# Patient Record
(demographics unavailable — no encounter records)

---

## 2024-10-10 NOTE — ASSESSMENT
[FreeTextEntry1] : Mr. CHRISTOPHER AWAN is a 55 year old current smoker (40 pack year hx)  man with HTN, HLD, CAD, chronic pancreatitis is here for evaluation.   #Pulmonary Nodules - imaging reviewd and discussed with patient CT chest  from 9/3/23 showed emphysema 1 cm solid nodule and 0.8 x 0.6 cm groundglass nodule in the right lower lobe. Repeat imaging Nov 21st with stable nodules. Given smoking, wt loss, ?biliary/pancreatic findings c/f underlying malignancy PET CT Jan 2024 - RLL nodule with new small calcific density, measures 6x5mm (was 10x8mm on prior CT), 3mm RUL nodule (stable), RLL GGO, no abnormal FDG activity in the lungs.  CT chest 4/2024 -  Stable 0.7 cm groundglass nodule in the right lower lobe when compared to previous exam. 1 cm nodular opacity in the posterior segment of the right lower lobe has increased in size when compared to previous exam.  CT chest 10/2024 - stable nodules Nodify lung testing Nov 2023 - 15% risk Nofidy lung 5/2024 - 15% risk of malignancy --Was evaluated by Dr. Sol.  Plan for watchful waiting.  Would repeat another CT chest in 6 months    #LIN/emphysema -  no e/o obstruction on PFTs  Nonspecific pattern. Notes improvmeent w Incruse + Albuterol --continue Incruse. Albuterol prn  #Smoker - negative effects of smoking and strategies for quitting discussed with patient -- smoking cessation strongly encouraged  #HCM -  UTD with flu vaccine needs PCV vaccination- will get at [facility]  All questions answered. Patient in agreement with plan.  Follow up 4mo

## 2024-10-10 NOTE — HISTORY OF PRESENT ILLNESS
[Current] : current [Never] : never [TextBox_4] : Mr. CHRISTOPHER AWAN is a 55 year old current smoker (40 pack year hx)  man with HTN, HLD, CAD, chronic pancreatitis is here for evaluation.   History: Was admitted to [facility] in 03/2023 for failure to thrive, was transferred to [facility] as he was found to have found to have CBD dilation with concern for pancreatic head mass and multiple cystic liver/pancreatic lesions concerning for abscesses. IR was consulted and given size, location of abscesses, deemed not suitable for drainage. CBD duct was placed Was treated with IV Zosyn and discharged to [facility].  Patient was hospitalized again 9/2023 with 40 pound weight loss, failure to thrive, hypotension.  CT chest  from 9/3/23 showed emphysema 1 cm solid nodule and 0.8 x 0.6 cm groundglass nodule in the right lower lobe.   Interval Events: Smoking about half a pack per day.  Using Incruse.  No shortness of breath no wheezing no chest tightness.  No albuterol use   ROS:  +abd pain; + wt loss denies fevers, chills, night sweats, unintentional weight loss denies known autoimmune disease  PMH: chronic pancreatitis;  Meds: per chart All: morphine SH: current smoker, currently down to 1/2 ppd; smoked since age 15 FH: Denies family hx of pulmonary or autoimmune disease PMD: Doesnt have PCP  [TextBox_11] : 1 [TextBox_13] : 40

## 2024-10-10 NOTE — COUNSELING
[Cessation strategies including cessation program discussed] : Cessation strategies including cessation program discussed [Use of nicotine replacement therapies and other medications discussed] : Use of nicotine replacement therapies and other medications discussed [Encouraged to pick a quit date and identify support needed to quit] : Encouraged to pick a quit date and identify support needed to quit [Yes] : Willing to quit smoking [FreeTextEntry3] : 12 [FreeTextEntry1] : 5

## 2024-11-07 NOTE — CONSULT LETTER
[Consult Letter:] : I had the pleasure of evaluating your patient, [unfilled]. [( Thank you for referring [unfilled] for consultation for _____ )] : Thank you for referring [unfilled] for consultation for [unfilled] [Please see my note below.] : Please see my note below. [Consult Closing:] : Thank you very much for allowing me to participate in the care of this patient.  If you have any questions, please do not hesitate to contact me. [Sincerely,] : Sincerely, [FreeTextEntry2] : Dr. Rocio Bauer (Pulm/Referring) [FreeTextEntry3] : Sarmad Sol MD, MPH  System Director of Thoracic Surgery  Director of Comprehensive Lung and Foregut [place]  Professor Cardiovascular & Thoracic Surgery   [facility] [phone] [place] [address] [place] [address] Tel: [phone] Fax: [phone]

## 2024-11-07 NOTE — PHYSICAL EXAM
[Fully active, able to carry on all pre-disease performance without restriction] : Status 0 - Fully active, able to carry on all pre-disease performance without restriction [Sclera] : the sclera and conjunctiva were normal [Extraocular Movements] : extraocular movements were intact [Neck Appearance] : the appearance of the neck was normal [Neck Cervical Mass (___cm)] : no neck mass was observed [] : no respiratory distress [Respiration, Rhythm And Depth] : normal respiratory rhythm and effort [Exaggerated Use Of Accessory Muscles For Inspiration] : no accessory muscle use [Auscultation Breath Sounds / Voice Sounds] : lungs were clear to auscultation bilaterally [Examination Of The Chest] : the chest was normal in appearance [Heart Rate And Rhythm] : heart rate was normal and rhythm regular [Chest Visual Inspection Thoracic Asymmetry] : no chest asymmetry [Diminished Respiratory Excursion] : normal chest expansion [2+] : left 2+ [Breast Appearance] : normal in appearance [Bowel Sounds] : normal bowel sounds [Breast Palpation Mass] : no palpable masses [Abdomen Soft] : soft [Abdomen Tenderness] : non-tender [Cervical Lymph Nodes Enlarged Posterior Bilaterally] : posterior cervical [Cervical Lymph Nodes Enlarged Anterior Bilaterally] : anterior cervical [Supraclavicular Lymph Nodes Enlarged Bilaterally] : supraclavicular [No CVA Tenderness] : no ~M costovertebral angle tenderness [No Spinal Tenderness] : no spinal tenderness [Involuntary Movements] : no involuntary movements were seen [Skin Color & Pigmentation] : normal skin color and pigmentation [No Focal Deficits] : no focal deficits [Oriented To Time, Place, And Person] : oriented to person, place, and time [FreeTextEntry1] : Deferred

## 2024-11-07 NOTE — HISTORY OF PRESENT ILLNESS
[FreeTextEntry1] : Mr. CHRISTOPHER AWAN, 56 year old male, current smoker (1/2 PPD x 40 years) , w/ hx of CAD, CHF, chronic pancreatitis, s/p Jose Guadalupe procedure 7/12/21. Developed obstructive jaundice 12/23 requiring stent placement. Stent subsequently removed in April 2024. Depression, Anxiety, COPD/Emphysema, iron-def anemia.  Follows w/ Pulm, Dr. Rocio Bauer. Referred for further evaluation of right lower lung nodule which was demonstrated on LDCT.  CT Chest on 4/23/24: - stable 1 cm nodular opacity is noted in the posterior segment of the right lower lobe.  - stable 0.7 cm groundglass nodule in the right lower lobe and a 0.3 cm solid nodule in the right upper lobe are noted. - No pleural effusions are noted.  Nodify test on 5/18/24: Moderate risk (15%).  PET/CT on 6/25/24: - Nonspecific uptake is noted in bilateral sternocleidomastoid muscles left more than right as well as the left longus coli muscle likely injury related. Nonspecific uptake is noted in both palatine and lingual tonsils - Slightly asymmetric uptake is noted in the right more than left submandibular gland. Attention on follow-up. - Small nonspecific subcentimeter bilateral level 2 faintly FDG avid lymph node nodes image 39, 42, 45 SUV 1.5 not significantly changed anatomically compared to PET CT scan from 1/16/2024. Attention on follow-up. - Previously noted right lower lobe lung nodule measures 1.1 x 0.9 cm image 138 (previously 0.6 x 0.5 cm) w/ SUV 1.2. - Stable right upper lobe 0.3 cm nodule image 89 too small to characterize by PET. - Biapical scarring with no abnormal FDG uptake. - Distinct FDG avid hepatic lesion in the caudate lobe image 160 SUV 4.8 with no clear CT correlate. Status post CBD stent removal. - Stable right adrenal gland 1.4 x 2.7 hypodensity with faint uptake less than liver suggestive of an adenoma image 165.  Nodule within posterior RLL w/ unclear etiology; Suspect may be inflammatory. Pt also would like to surveilliance.   CT Chest on 10/4/24 - no significant change in 1 cm nodular opacity in posterior segment of right lower lobe (3:433) which previously exhibited low intensity on prior PET scan.   Pt presents today for follow up. Overall, feels well. Today, patient denies worsening SOB, chest pain, cough, hemoptysis, fever, chills, night sweats, lightheadedness or dizziness.

## 2024-11-07 NOTE — ASSESSMENT
[FreeTextEntry1] : Mr. CHRISTOPHER AWAN, 56 year old male, current smoker (1/2 PPD x 40 years) , w/ hx of CAD, CHF, chronic pancreatitis, s/p Jose Guadalupe procedure 7/12/21. Developed obstructive jaundice 12/23 requiring stent placement. Stent subsequently removed in April 2024. Depression, Anxiety, COPD/Emphysema, iron-def anemia.  Follows w/ Pulm, Dr. Rocio Bauer. Referred for further evaluation of right lower lung nodule which was demonstrated on LDCT.  CT Chest on 4/23/24: - stable 1 cm nodular opacity is noted in the posterior segment of the right lower lobe.  - stable 0.7 cm groundglass nodule in the right lower lobe and a 0.3 cm solid nodule in the right upper lobe are noted. - No pleural effusions are noted.  Nodify test on 5/18/24: Moderate risk (15%).  PET/CT on 6/25/24: - Nonspecific uptake is noted in bilateral sternocleidomastoid muscles left more than right as well as the left longus coli muscle likely injury related. Nonspecific uptake is noted in both palatine and lingual tonsils - Slightly asymmetric uptake is noted in the right more than left submandibular gland. Attention on follow-up. - Small nonspecific subcentimeter bilateral level 2 faintly FDG avid lymph node nodes image 39, 42, 45 SUV 1.5 not significantly changed anatomically compared to PET CT scan from 1/16/2024. Attention on follow-up. - Previously noted right lower lobe lung nodule measures 1.1 x 0.9 cm image 138 (previously 0.6 x 0.5 cm) w/ SUV 1.2. - Stable right upper lobe 0.3 cm nodule image 89 too small to characterize by PET. - Biapical scarring with no abnormal FDG uptake. - Distinct FDG avid hepatic lesion in the caudate lobe image 160 SUV 4.8 with no clear CT correlate. Status post CBD stent removal. - Stable right adrenal gland 1.4 x 2.7 hypodensity with faint uptake less than liver suggestive of an adenoma image 165.  Nodule within posterior RLL w/ unclear etiology; Suspect may be inflammatory. Pt also would like to surveillance.   CT Chest on 10/4/24 - no significant change in 1 cm nodular opacity in posterior segment of right lower lobe (3:433) which previously exhibited low intensity on prior PET scan.   I have reviewed the patient's medical records and diagnostic images at time of this office consultation and have made the following recommendation: - 1 cm RLL nodule unchanged when compared to prior imaging; Etiology unclear. Discussed surgery for definitive diagnosis and therapeutic purposes vs active surveillance. Risks/benefits of both reviewed. At this time, patient opting to continue with active surveillance. Recommend repeat non contrast CT Chest in 6 months. Instructed to return to clinic 1-2 weeks following to discuss results and further plan of care. He is agreeable.  - Smoking Cessation discussed. Patient will consider.   I, SARIAH Tse, personally performed the evaluation and management (E/M) services for this established patient. That E/M includes conducting the examination, assessing all new/exacerbated conditions, and establishing a new plan of care. Today, My ACP, Ofelia Dodge, was here to observe my evaluation and management services for this patient to be followed going forward.

## 2024-11-07 NOTE — PHYSICAL EXAM
[Fully active, able to carry on all pre-disease performance without restriction] : Status 0 - Fully active, able to carry on all pre-disease performance without restriction [Sclera] : the sclera and conjunctiva were normal [Extraocular Movements] : extraocular movements were intact [Neck Appearance] : the appearance of the neck was normal [Neck Cervical Mass (___cm)] : no neck mass was observed [] : no respiratory distress [Respiration, Rhythm And Depth] : normal respiratory rhythm and effort [Exaggerated Use Of Accessory Muscles For Inspiration] : no accessory muscle use [Auscultation Breath Sounds / Voice Sounds] : lungs were clear to auscultation bilaterally [Heart Rate And Rhythm] : heart rate was normal and rhythm regular [Examination Of The Chest] : the chest was normal in appearance [Chest Visual Inspection Thoracic Asymmetry] : no chest asymmetry [Diminished Respiratory Excursion] : normal chest expansion [2+] : left 2+ [Breast Appearance] : normal in appearance [Bowel Sounds] : normal bowel sounds [Breast Palpation Mass] : no palpable masses [Abdomen Soft] : soft [Abdomen Tenderness] : non-tender [Cervical Lymph Nodes Enlarged Posterior Bilaterally] : posterior cervical [Supraclavicular Lymph Nodes Enlarged Bilaterally] : supraclavicular [Cervical Lymph Nodes Enlarged Anterior Bilaterally] : anterior cervical [No CVA Tenderness] : no ~M costovertebral angle tenderness [No Spinal Tenderness] : no spinal tenderness [Involuntary Movements] : no involuntary movements were seen [Skin Color & Pigmentation] : normal skin color and pigmentation [No Focal Deficits] : no focal deficits [Oriented To Time, Place, And Person] : oriented to person, place, and time [FreeTextEntry1] : Deferred

## 2024-11-07 NOTE — CONSULT LETTER
[Consult Letter:] : I had the pleasure of evaluating your patient, [unfilled]. [( Thank you for referring [unfilled] for consultation for _____ )] : Thank you for referring [unfilled] for consultation for [unfilled] [Please see my note below.] : Please see my note below. [Consult Closing:] : Thank you very much for allowing me to participate in the care of this patient.  If you have any questions, please do not hesitate to contact me. [Sincerely,] : Sincerely, [FreeTextEntry2] : Dr. Rocio Bauer (Pulm/Referring) [FreeTextEntry3] : Sarmad Sol MD, MPH  System Director of Thoracic Surgery  Director of Comprehensive Lung and Foregut [place]  Professor Cardiovascular & Thoracic Surgery   [facility] at [facility] [phone] [place] [address] [place] [address] Tel: [phone] Fax: [phone]

## 2025-03-04 NOTE — CONSULT LETTER
[Dear  ___] : Dear  [unfilled], [Consult Letter:] : I had the pleasure of evaluating your patient, [unfilled]. [Please see my note below.] : Please see my note below. [Consult Closing:] : Thank you very much for allowing me to participate in the care of this patient.  If you have any questions, please do not hesitate to contact me. [FreeTextEntry3] : Sincerely,  Rocio Bauer MD [facility] tel: [phone] fax: [phone]

## 2025-03-04 NOTE — HISTORY OF PRESENT ILLNESS
[Current] : current [Never] : never [TextBox_4] : Mr. CHRISTOPHER AWAN is a 55 year old current smoker (40 pack year hx)  man with HTN, HLD, CAD, chronic pancreatitis is here for evaluation.   History: Was admitted to [facility] in 03/2023 for failure to thrive, was transferred to [facility] as he was found to have found to have CBD dilation with concern for pancreatic head mass and multiple cystic liver/pancreatic lesions concerning for abscesses. IR was consulted and given size, location of abscesses, deemed not suitable for drainage. CBD duct was placed Was treated with IV Zosyn and discharged to [facility].  Patient was hospitalized again 9/2023 with 40 pound weight loss, failure to thrive, hypotension.  CT chest  from 9/3/23 showed emphysema 1 cm solid nodule and 0.8 x 0.6 cm groundglass nodule in the right lower lobe.   Interval Events: 3/2025: Fell, fractured his clavicle. Planned for surgery. Continues to smoke. Compliant with Anoro.  No albuterol use, no recent exacerbation or steroid use.  Denies shortness of breath, cough, wheezing   ROS:  +abd pain; + wt loss denies fevers, chills, night sweats, unintentional weight loss denies known autoimmune disease  PMH: chronic pancreatitis;  Meds: per chart All: morphine SH: current smoker, currently down to 1/2 ppd; smoked since age 15 FH: Denies family hx of pulmonary or autoimmune disease PMD: Doesnt have PCP  [TextBox_11] : 1 [TextBox_13] : 40

## 2025-03-04 NOTE — PHYSICAL EXAM
[No Acute Distress] : no acute distress [Normal Oropharynx] : normal oropharynx [Normal Appearance] : normal appearance [No Neck Mass] : no neck mass [Normal Rate/Rhythm] : normal rate/rhythm [Normal S1, S2] : normal s1, s2 [No Murmurs] : no murmurs [No Resp Distress] : no resp distress [Clear to Auscultation Bilaterally] : clear to auscultation bilaterally [No Abnormalities] : no abnormalities [Benign] : benign [Normal Gait] : normal gait [No Clubbing] : no clubbing [No Cyanosis] : no cyanosis [No Edema] : no edema [FROM] : FROM [Normal Color/ Pigmentation] : normal color/ pigmentation [No Focal Deficits] : no focal deficits [Oriented x3] : oriented x3 [Normal Affect] : normal affect [TextBox_2] : thin, ill appearing

## 2025-03-04 NOTE — COUNSELING
[Cessation strategies including cessation program discussed] : Cessation strategies including cessation program discussed [Use of nicotine replacement therapies and other medications discussed] : Use of nicotine replacement therapies and other medications discussed [Encouraged to pick a quit date and identify support needed to quit] : Encouraged to pick a quit date and identify support needed to quit [Yes] : Willing to quit smoking [FreeTextEntry1] : 5 [FreeTextEntry3] : 12

## 2025-03-04 NOTE — END OF VISIT
Yes...
[Time Spent: ___ minutes] : I have spent [unfilled] minutes of time on the encounter which excludes teaching and separately reported services.

## 2025-03-04 NOTE — ASSESSMENT
[FreeTextEntry1] : Mr. CHRISTOPHER AWAN is a 55 year old current smoker (40 pack year hx)  man with HTN, HLD, CAD, chronic pancreatitis is here for evaluation.   #Pre-operative evaluaiton -planned for OR for displaced left clavicle fracture.  From pulmonary standpoint, no contraindication to upcoming procedure.  It was discussed with patient that smoking cessation would decrease risk of perioperative pulmonary complications.  Recommend continuing Anoro as prescribed.  As needed bronchodilators could be used perioperatively  #Pulmonary Nodules - imaging reviewd and discussed with patient CT chest  from 9/3/23 showed emphysema 1 cm solid nodule and 0.8 x 0.6 cm groundglass nodule in the right lower lobe. Repeat imaging Nov 21st with stable nodules. Given smoking, wt loss, ?biliary/pancreatic findings c/f underlying malignancy PET CT Jan 2024 - RLL nodule with new small calcific density, measures 6x5mm (was 10x8mm on prior CT), 3mm RUL nodule (stable), RLL GGO, no abnormal FDG activity in the lungs.  CT chest 4/2024 -  Stable 0.7 cm groundglass nodule in the right lower lobe when compared to previous exam. 1 cm nodular opacity in the posterior segment of the right lower lobe has increased in size when compared to previous exam.  CT chest 10/2024 - Similar 1 cm nodular opacity in posterior segment of right lower lobe, which exhibited mild avidity on prior PET. Nodify lung testing Nov 2023 - 15% risk Nofidy lung 5/2024 - 15% risk of malignancy --Was evaluated by Dr. Sol.  Plan for repeat CT chest May 2025    #LIN/emphysema -  no e/o obstruction on PFTs  Nonspecific pattern.  -- c/s Anoro. Albuterol prn  #Smoker - negative effects of smoking and strategies for quitting discussed with patient -- smoking cessation strongly encouraged.  Patient will try nicotine patches plus gum  #HCM -  UTD with flu vaccine needs PCV vaccination- will get at [facility]  All questions answered. Patient in agreement with plan.  Follow up 4-6mo

## 2025-03-05 NOTE — PHYSICAL EXAM
[Alert] : alert [Normal Voice/Communication] : normal voice/communication [No Acute Distress] : no acute distress [Sclera] : the sclera and conjunctiva were normal [Hearing Threshold Finger Rub Not [place]] : hearing was normal [Normal Lips/Gums] : the lips and gums were normal [Oropharynx] : the oropharynx was normal [Normal Appearance] : the appearance of the neck was normal [No Neck Mass] : no neck mass was observed [No Respiratory Distress] : no respiratory distress [No Acc Muscle Use] : no accessory muscle use [Respiration, Rhythm And Depth] : normal respiratory rhythm and effort [Abdomen Tenderness] : non-tender [No Masses] : no abdominal mass palpated [Abdomen Soft] : soft [] : no hepatosplenomegaly [Oriented To Time, Place, And Person] : oriented to person, place, and time [de-identified] : thin, muscle wasting (temporal, hands)

## 2025-03-05 NOTE — HISTORY OF PRESENT ILLNESS
[FreeTextEntry1] : CHRISTOPHER AWAN is a 56 year old male with a history of chronic pancreatitis c/b significant pain s/p Jose Guadalupe procedure, depression,  pulm nodule, emphysema  He is presenting today for followup  He had a fall on Feb 9th and caused a broken left clavicle - pending mutliD clearance for surgery (clearances pending appts next week)  He is not feeling well Increased weight loss - lost over 20lbs in last couple of months (170 -->140 now) Poor appetite - forcing food - couple of weeks - nothing tastes good, it takes a long time to eat Has central abdominal pain, a little lower than usual pancreas pain, dull, nauseating Having "hot flashes" with head sweating Stools are loose vs diarrhea, sometimes solid, no blood  Hasn't been eating enough to take Creon  Had colonoscopy in 2020 Last EGD/EUS/ERCP with me in Spe 2024 - severely calcified pancreas difficult to see, previously placed CBD stent removed No labs or imaging since then  +Depression, stopped his meds/ insurance lapsed and he needs to call psychiatrist Not suicidal but very lonely, has no one to call, lives with brother but doesn't talk much Still smoking 2 beers last week but not drinking ETOH generally

## 2025-03-05 NOTE — ASSESSMENT
[FreeTextEntry1] : CHRISTOPHER AWAN is a 56 year old male with a history of chronic pancreatitis c/b significant pain s/p Jose Guadalupe procedure, depression,  pulm nodule, emphysema  #Signifiant unintentional weight loss with sweats, anorexia #Abd pain, nausea #Chronic calcific pancreatitis s/p Jose Guadalupe procedure #Tobacco use #Fractured left clavicle  Recs: - concern for malignancy; will check labs now and get CT A/P - pending results of above will likely plan for EGD/colonoscopy and probably EUS again - Will also talk about Creon, zofran scripts, appetite stimulants pending workup - try to focus on protein for now  - get back in touch with psychiatrist to get back on antidepressants/behavioral therapy  - followup in office pending above

## 2025-03-07 NOTE — HISTORY OF PRESENT ILLNESS
[FreeTextEntry1] : bumps on the scalp [de-identified] : 56M with a hx of seb derm, well controlled with keto, here for   1) bumps on the scalp x months. Hx of AK and iSK

## 2025-03-07 NOTE — PHYSICAL EXAM
[Alert] : alert [Oriented x 3] : ~L oriented x 3 [Well Nourished] : well nourished [Conjunctiva Non-injected] : conjunctiva non-injected [No Visual Lymphadenopathy] : no visual  lymphadenopathy [No Clubbing] : no clubbing [No Edema] : no edema [No Bromhidrosis] : no bromhidrosis [No Chromhidrosis] : no chromhidrosis [FreeTextEntry3] : Stuck on brown papules on the scalp Face otherwise with mild erythema in the NLF

## 2025-05-06 NOTE — HISTORY OF PRESENT ILLNESS
[de-identified] : 57yo male who is being referred by Dr. Huerta for incidental finding noted on recent PET scan done for 4/17/2025 PET/CT scan reporting focal areas of activity in the tongue. Intense activity in the larynx at the levels of the vocal cords without abnormality on the low dose non contrast CT

## 2025-05-08 NOTE — CONSULT LETTER
[Consult Letter:] : I had the pleasure of evaluating your patient, [unfilled]. [( Thank you for referring [unfilled] for consultation for _____ )] : Thank you for referring [unfilled] for consultation for [unfilled] [Please see my note below.] : Please see my note below. [Consult Closing:] : Thank you very much for allowing me to participate in the care of this patient.  If you have any questions, please do not hesitate to contact me. [Sincerely,] : Sincerely, [FreeTextEntry2] : Dr. Rocio Bauer (Pulm/Referring) [FreeTextEntry3] : Sarmad Sol MD, MPH  System Director of Thoracic Surgery  Director of Comprehensive Lung and Foregut [place]  Professor Cardiovascular & Thoracic Surgery   [facility] at [facility] [phone] [place] [address] [place] [address] Tel: [phone] Fax: [phone]

## 2025-05-08 NOTE — CONSULT LETTER
[Consult Letter:] : I had the pleasure of evaluating your patient, [unfilled]. [( Thank you for referring [unfilled] for consultation for _____ )] : Thank you for referring [unfilled] for consultation for [unfilled] [Please see my note below.] : Please see my note below. [Consult Closing:] : Thank you very much for allowing me to participate in the care of this patient.  If you have any questions, please do not hesitate to contact me. [Sincerely,] : Sincerely, [FreeTextEntry2] : Dr. Rocio Bauer (Pulm/Referring) [FreeTextEntry3] : Sarmad Sol MD, MPH  System Director of Thoracic Surgery  Director of Comprehensive Lung and Foregut [place]  Professor Cardiovascular & Thoracic Surgery   [facility] at [facility] [phone] [place] [address] [place] [address] Tel: [phone] Fax: [phone]

## 2025-05-08 NOTE — PHYSICAL EXAM
[Respiration, Rhythm And Depth] : normal respiratory rhythm and effort [Exaggerated Use Of Accessory Muscles For Inspiration] : no accessory muscle use [Auscultation Breath Sounds / Voice Sounds] : lungs were clear to auscultation bilaterally [Apical Impulse] : the apical impulse was normal [Heart Rate And Rhythm] : heart rate was normal and rhythm regular [Heart Sounds] : normal S1 and S2 [Heart Sounds Gallop] : no gallops [Examination Of The Chest] : the chest was normal in appearance [No Pulse Delay] : no pulse delay [Bowel Sounds] : normal bowel sounds [Abdomen Soft] : soft [Abdomen Tenderness] : non-tender [No CVA Tenderness] : no ~M costovertebral angle tenderness [Abnormal Walk] : normal gait [Nail Clubbing] : no clubbing  or cyanosis of the fingernails [Involuntary Movements] : no involuntary movements were seen [Skin Color & Pigmentation] : normal skin color and pigmentation [Skin Turgor] : normal skin turgor [] : no rash [No Focal Deficits] : no focal deficits [Oriented To Time, Place, And Person] : oriented to person, place, and time [Affect] : the affect was normal [Mood] : the mood was normal

## 2025-05-08 NOTE — CONSULT LETTER
[Consult Letter:] : I had the pleasure of evaluating your patient, [unfilled]. [( Thank you for referring [unfilled] for consultation for _____ )] : Thank you for referring [unfilled] for consultation for [unfilled] [Please see my note below.] : Please see my note below. [Consult Closing:] : Thank you very much for allowing me to participate in the care of this patient.  If you have any questions, please do not hesitate to contact me. [Sincerely,] : Sincerely, [FreeTextEntry2] : Dr. Rocio Bauer (Pulm/Referring) [FreeTextEntry3] : Sarmad Sol MD, MPH  System Director of Thoracic Surgery  Director of Comprehensive Lung and Foregut [place]  Professor Cardiovascular & Thoracic Surgery   [facility] at [facility] [phone] [place] [address] [place] [address] Tel: [phone] Fax: [phone]

## 2025-05-08 NOTE — HISTORY OF PRESENT ILLNESS
[FreeTextEntry1] : Mr. CHRISTOPHER AWAN, 56 year old male, current smoker (1/2 PPD x 40 years) , w/ hx of CAD, CHF, chronic pancreatitis, s/p Jose Guadalupe procedure 7/12/21. Developed obstructive jaundice 12/23 requiring stent placement. Stent subsequently removed in April 2024. Depression, Anxiety, COPD/Emphysema, iron-def anemia.  Follows w/ Pulm, Dr. Rocio Bauer. Referred for further evaluation of right lower lung nodule which was demonstrated on LDCT.  CT Chest on 4/23/24: - stable 1 cm nodular opacity is noted in the posterior segment of the right lower lobe.  - stable 0.7 cm groundglass nodule in the right lower lobe and a 0.3 cm solid nodule in the right upper lobe are noted. - No pleural effusions are noted.  Nodify test on 5/18/24: Moderate risk (15%).  PET/CT on 6/25/24: - Nonspecific uptake is noted in bilateral sternocleidomastoid muscles left more than right as well as the left longus coli muscle likely injury related. Nonspecific uptake is noted in both palatine and lingual tonsils - Slightly asymmetric uptake is noted in the right more than left submandibular gland. Attention on follow-up. - Small nonspecific subcentimeter bilateral level 2 faintly FDG avid lymph node nodes image 39, 42, 45 SUV 1.5 not significantly changed anatomically compared to PET CT scan from 1/16/2024. Attention on follow-up. - Previously noted right lower lobe lung nodule measures 1.1 x 0.9 cm image 138 (previously 0.6 x 0.5 cm) w/ SUV 1.2. - Stable right upper lobe 0.3 cm nodule image 89 too small to characterize by PET. - Biapical scarring with no abnormal FDG uptake. - Distinct FDG avid hepatic lesion in the caudate lobe image 160 SUV 4.8 with no clear CT correlate. Status post CBD stent removal. - Stable right adrenal gland 1.4 x 2.7 hypodensity with faint uptake less than liver suggestive of an adenoma image 165.  Nodule within posterior RLL w/ unclear etiology; Suspect may be inflammatory. Pt also would like to surveilliance.   CT Chest on 10/4/24 - no significant change in 1 cm nodular opacity in posterior segment of right lower lobe (3:433) which previously exhibited low intensity on prior PET scan.   CT C/A/P on 3/18/25: - increased 1.6 x 0.9 cm RLL solid nodule - stable scattered ggos: 0.5cm RUL (6:31); 0.5cm RUL (6:40); 0.6 cm RLL (6:61); 0.6 cm GOPAL (6:34); 0.4 cm GOPAL (6:60) - mildly increased biliary ductal dilation from 3/24/25 - T8 vertebral body compression with mult. chronic Lt rib fractures new since 10/4/24.   PFT on 3/4/25: FEV1 pre 3.41, 83%; DLCO 27.2, 123%.   Patient presents today for follow-up. The patient denies SOB, cough, chest pain, hemoptysis, palpitation, fever. He feels well today.

## 2025-05-08 NOTE — PHYSICAL EXAM
[Respiration, Rhythm And Depth] : normal respiratory rhythm and effort [Exaggerated Use Of Accessory Muscles For Inspiration] : no accessory muscle use [Auscultation Breath Sounds / Voice Sounds] : lungs were clear to auscultation bilaterally [Apical Impulse] : the apical impulse was normal [Heart Rate And Rhythm] : heart rate was normal and rhythm regular [Heart Sounds] : normal S1 and S2 [Heart Sounds Gallop] : no gallops [Examination Of The Chest] : the chest was normal in appearance [No Pulse Delay] : no pulse delay [Bowel Sounds] : normal bowel sounds [Abdomen Soft] : soft [Abdomen Tenderness] : non-tender [No CVA Tenderness] : no ~M costovertebral angle tenderness [Abnormal Walk] : normal gait normal... [Nail Clubbing] : no clubbing  or cyanosis of the fingernails [Involuntary Movements] : no involuntary movements were seen [Skin Color & Pigmentation] : normal skin color and pigmentation [Skin Turgor] : normal skin turgor [] : no rash [No Focal Deficits] : no focal deficits [Oriented To Time, Place, And Person] : oriented to person, place, and time [Affect] : the affect was normal [Mood] : the mood was normal

## 2025-05-08 NOTE — ASSESSMENT
[FreeTextEntry1] : Mr. CHRISTOPHER AWAN, 56 year old male, current smoker (1/2 PPD x 40 years) , w/ hx of CAD, CHF, chronic pancreatitis, s/p Jose Guadalupe procedure 7/12/21. Developed obstructive jaundice 12/23 requiring stent placement. Stent subsequently removed in April 2024. Depression, Anxiety, COPD/Emphysema, iron-def anemia.  Follows w/ Pulm, Dr. Rocio Bauer. Referred for further evaluation of right lower lung nodule which was demonstrated on LDCT.  Nodify test on 5/18/24: Moderate risk (15%).  Nodule within posterior RLL w/ unclear etiology; Suspect may be inflammatory. Pt also would like to surveilliance.   CT C/A/P on 3/18/25: - increased 1.6 x 0.9 cm RLL solid nodule - stable scattered ggos: 0.5cm RUL (6:31); 0.5cm RUL (6:40); 0.6 cm RLL (6:61); 0.6 cm GOPAL (6:34); 0.4 cm GOPAL (6:60) - mildly increased biliary ductal dilation from 3/24/25 - T8 vertebral body compression with mult. chronic Lt rib fractures new since 10/4/24.    PFT on 3/4/25: FEV1 pre 3.41, 83%; DLCO 27.2, 123%.   I have reviewed the patient's medical records and diagnostic images at the time of this office consultation and have made the following recommendation: 1. Increasing size of RLL nodule, suspicious for malignancy, recommend him to have right VATS, robotic assist, right lower lobe wedge resection, possible segmentectomy, MLND on 6/2/25. All risks vs. benefits and alternatives were explained to the patient, all questions were answered, patient verbalized understanding, was in agreement with the plan to proceed. 2. PET/CT to r/o mets. 3. Medical clearance and PST.  4. Smoking cessation counseling given to him today, he says will try to stop smoking.      I, Dr. DAWSON, SARIAH WALTERS, personally performed the evaluation and management (E/M) services for this established patient who follow up today with an existing condition.  That E/M includes conducting the examination, assessing all new/exacerbated/existing conditions, and establishing a plan of care. Today, my ACP, Fanny Hall NP, was here to observe my evaluation and management services for this existing condition to be followed going forward.

## 2025-05-23 NOTE — HISTORY OF PRESENT ILLNESS
[de-identified] : 55yo male who is being referred by Dr. Huerta for incidental finding noted on recent PET scan done for 4/17/2025 PET/CT scan reporting focal areas of activity in the tongue. Intense activity in the larynx at the levels of the vocal cords without abnormality on the low dose non contrast CT

## 2025-05-28 NOTE — HISTORY OF PRESENT ILLNESS
[FreeTextEntry1] : 57 year old male with a history of chronic pancreatitis c/b significant pain s/p Jose Guadalupe procedure, depression, pulm nodule, emphysema  He is presenting for followup  Since last visit he has seen onc (Merritt) who completed workup for unintentional wt loss PET CT showed uptake in posterior tongue, pending ENT visit, lung nodules, otherwise no major concerning findings Lung nodules stable except one 1.6cm one that increased in size - possible malignant - going for VATS on Tuesday with Dr. Sarmad Sol Going to PT for broken clavicle, will need pulm clearance to resume after VATS, has ortho followup in a few months Pending followup with his psychiatrist to discuss medication adjustments  CEA was elevated slightly 4.4 (ULN 3.8), last colon a long time ago  He has started mirtazipine; has megestrol for appetite stim as well He has been eating more - has gained back ~15lbs since I last saw him Breakfast- cereal no lunch Dinner - cheeseburger, chicken Snacks - cereal, ice cream  Needs Creon refill to start taking again   He is still smoking but cut down from 1+ packs to 10cig/day Trying to quit Very rare ETOH - had a drink on his birthday last week Talking to ex-GF again, hopeful it works out

## 2025-05-28 NOTE — PHYSICAL EXAM
[Alert] : alert [Normal Voice/Communication] : normal voice/communication [Healthy Appearing] : healthy appearing [No Acute Distress] : no acute distress [Sclera] : the sclera and conjunctiva were normal [Hearing Threshold Finger Rub Not [place]] : hearing was normal [Normal Lips/Gums] : the lips and gums were normal [Oropharynx] : the oropharynx was normal [Normal Appearance] : the appearance of the neck was normal [No Neck Mass] : no neck mass was observed [No Respiratory Distress] : no respiratory distress [Respiration, Rhythm And Depth] : normal respiratory rhythm and effort [Abdomen Soft] : soft [Oriented To Time, Place, And Person] : oriented to person, place, and time [de-identified] : appears to have gained weight

## 2025-05-28 NOTE — ASSESSMENT
[FreeTextEntry1] : 57 year old male with a history of chronic pancreatitis c/b significant pain s/p Jose Guadalupe procedure, depression, pulm nodule, emphysema  #Elevated CEA 4.4  #Unintentional weight loss -now improving, gained 15+ lbs #Chronic pancreatitis/EPI #Lung nodules #Abnormal tongue PET uptakes # Chronic tobacco use  Recs: - will plan for EGD/EUS/colonoscopy for CEA evaluation + check bile duct, pancreas - given normal liver tests will not plan for ERCP/stent for now, repeat labs next visit - followup with ENT for tongue evaluation - Refilled Creon, take 2 tabs with meals - continue mirtazapine, megestrol for appetite stim - followup with psychiatrist for med adjustment  - The patient was instructed on the colonoscopy prep, including low fiber diet x 1 week, clear liquid diet x 1 day prior to the procedure, NPO two hours prior to the procedure, and instructions for use of the prescribed prep itself. - Benefits and risks of the procedure (including but not limited to pain, infection, bleeding, perforation, injury to internal organs, missed lesions, IV site problems, risks of anesthesia, possible need for further procedures including emergency surgery) were explained to the patient. Alternatives to the procedure were discussed. The patient was agreeable to proceed. - no PST since just went last week - followup with me after procedure in office

## 2025-06-27 NOTE — HISTORY OF PRESENT ILLNESS
[Current] : current [Never] : never [TextBox_4] : Mr. CHRISTOPHER AWAN is a 55 year old current smoker (40 pack year hx)  man with HTN, HLD, CAD, chronic pancreatitis is here for evaluation.   History: Was admitted to [facility] in 03/2023 for failure to thrive, was transferred to [facility] as he was found to have found to have CBD dilation with concern for pancreatic head mass and multiple cystic liver/pancreatic lesions concerning for abscesses. IR was consulted and given size, location of abscesses, deemed not suitable for drainage. CBD duct was placed Was treated with IV Zosyn and discharged to [facility].  Patient was hospitalized again 9/2023 with 40 pound weight loss, failure to thrive, hypotension.  CT chest  from 9/3/23 showed emphysema 1 cm solid nodule and 0.8 x 0.6 cm groundglass nodule in the right lower lobe.   Interval Events: 3/2025: Fell, fractured his clavicle. Planned for surgery. Continues to smoke. Compliant with Anoro.  No albuterol use, no recent exacerbation or steroid use.  Denies shortness of breath, cough, wheezing  6/2025: s/p RLL wedge resection with Dr Sol on 6/2/25 - patology with Minimally invasive adenocarcinoma, nonmucinous. The tumor spans 0.9 cm with 2 mm of solid pattern invasion. No visceral pleural or lymphovascular invasion is identified. The surgical margin is negative. LNs are negative. Feels well. Notes mild discomfort under Right ribs in mid axillary line.  Compliant with Anoro.  Denies respiratory issues at this time.  Still smoking   ROS:  +abd pain; + wt loss denies fevers, chills, night sweats, unintentional weight loss denies known autoimmune disease  PMH: chronic pancreatitis;  Meds: per chart All: morphine SH: current smoker, currently down to 1/2 ppd; smoked since age 15 FH: Denies family hx of pulmonary or autoimmune disease PMD: Doesnt have PCP  [TextBox_11] : 1 [TextBox_13] : 40

## 2025-06-27 NOTE — CONSULT LETTER
[Dear  ___] : Dear  [unfilled], [Consult Letter:] : I had the pleasure of evaluating your patient, [unfilled]. [Please see my note below.] : Please see my note below. [Consult Closing:] : Thank you very much for allowing me to participate in the care of this patient.  If you have any questions, please do not hesitate to contact me. [FreeTextEntry3] : Sincerely,  Rocio Bauer MD [facility] tel: [phone] fax: [phone]

## 2025-06-27 NOTE — ASSESSMENT
[FreeTextEntry1] : Mr. CHRISTOPHER AWAN is a 55 year old current smoker (40 pack year hx)  man with HTN, HLD, CAD, chronic pancreatitis is here for evaluation.   #Lung adenocarcinoma --  s/p RLL wedge resection with Dr Sol on 6/2/25 - patology with Minimally invasive adenocarcinoma, nonmucinous.  The tumor spans 0.9 cm with 2 mm of solid pattern invasion.  No visceral pleural or lymphovascular invasion is identified.  The surgical margin is negative. LNs are negative.  -- will f/u with Dr Sol next week -- repeat CT chest in 3mo   #LIN/emphysema -  no e/o obstruction on PFTs  Nonspecific pattern.  -- c/s Anoro. Albuterol prn  #Smoker - negative effects of smoking and strategies for quitting discussed with patient -- smoking cessation strongly encouraged.  Patient will try nicotine patches plus gum  #HCM -  UTD with flu vaccine needs PCV vaccination- will get at [facility]  All questions answered. Patient in agreement with plan.  Follow up 4-mo

## 2025-07-03 NOTE — REASON FOR VISIT
[de-identified] : FB, Rt VATS, wedge rxn of RLL, MLND, additional wedge resection of right lower lobe staple margin [de-identified] : 6/2/25

## 2025-07-03 NOTE — ASSESSMENT
[FreeTextEntry1] : Mr. CHRISTOPHER AWAN, 56 year old male, current smoker (1/2 PPD x 40 years) , w/ hx of CAD, CHF, chronic pancreatitis, s/p Jose Guadalupe procedure 7/12/21. Developed obstructive jaundice 12/23 requiring stent placement. Stent subsequently removed in April 2024. Depression, Anxiety, COPD/Emphysema, iron-def anemia. Follows w/ Pulm, Dr. Rocio Bauer. Referred for further evaluation of right lower lung nodule which was demonstrated on LDCT.  CT Chest on 4/23/24: - stable 1 cm nodular opacity is noted in the posterior segment of the right lower lobe. - stable 0.7 cm groundglass nodule in the right lower lobe and a 0.3 cm solid nodule in the right upper lobe are noted. - No pleural effusions are noted.  Nodify test on 5/18/24: Moderate risk (15%).  PET/CT on 6/25/24: - Nonspecific uptake is noted in bilateral sternocleidomastoid muscles left more than right as well as the left longus coli muscle likely injury related. Nonspecific uptake is noted in both palatine and lingual tonsils - Slightly asymmetric uptake is noted in the right more than left submandibular gland. Attention on follow-up. - Small nonspecific subcentimeter bilateral level 2 faintly FDG avid lymph node nodes image 39, 42, 45 SUV 1.5 not significantly changed anatomically compared to PET CT scan from 1/16/2024. Attention on follow-up. - Previously noted right lower lobe lung nodule measures 1.1 x 0.9 cm image 138 (previously 0.6 x 0.5 cm) w/ SUV 1.2. - Stable right upper lobe 0.3 cm nodule image 89 too small to characterize by PET. - Biapical scarring with no abnormal FDG uptake. - Distinct FDG avid hepatic lesion in the caudate lobe image 160 SUV 4.8 with no clear CT correlate. Status post CBD stent removal. - Stable right adrenal gland 1.4 x 2.7 hypodensity with faint uptake less than liver suggestive of an adenoma image 165.  Nodule within posterior RLL w/ unclear etiology; Suspect may be inflammatory. Pt also would like to surveilliance.  CT Chest on 10/4/24 - no significant change in 1 cm nodular opacity in posterior segment of right lower lobe (3:433) which previously exhibited low intensity on prior PET scan.  CT C/A/P on 3/18/25: - increased 1.6 x 0.9 cm RLL solid nodule - stable scattered ggos: 0.5cm RUL (6:31); 0.5cm RUL (6:40); 0.6 cm RLL (6:61); 0.6 cm GOPAL (6:34); 0.4 cm GOPAL (6:60) - mildly increased biliary ductal dilation from 3/24/25 - T8 vertebral body compression with mult. chronic Lt rib fractures new since 10/4/24.  PFT on 3/4/25: FEV1 pre 3.41, 83%; DLCO 27.2, 123%.  PET/CT on 4/17/25 at [facility]: - focal areas of activity in tongue; Rt anterolaterally (SUV 6.7) & Rt posterolaterally (SUV 5.7) - 0.3cm RUL nodular density (2:68) - 0.7cm GOPAL mixed ettenuation density (2:67) - 0.3cm GOPAL nodular density (2:78)  Now s/p FB, Rt VATS, wedge rxn of RLL, MLND, additional wedge resection of right lower lobe staple margin on 6/2/25. Path revealed RLL minimally invasive adenocarcinoma, non-mucinous, 9 mm with 2 mm invasive tumor size, all margins and LNs are negative, [id number]. Stg IA1.   CXR today - Stable, post op changes.   Patient presents today for post op evaluation. + pain to surgical site radiating anteriorly. Otherwise feels well; Has been increasing activity as tolerated. Today, patient denies hemoptysis, fever, chills, night sweats, lightheadedness or dizziness.  I have reviewed the patient's medical records and diagnostic images at time of this office consultation and have made the following recommendation: 1. Patient overall feeling well post operatively with stable CXR. Path reviewed; Stage I lung cancer. No need for adjuvant treatment at this time. Discussed role of active surveillance post op. Recommend repeating a non contrast CT Chest in 6 months for lung cancer surviellance as well as re-evaluate additional lung nodules. Patient is agreeable.  2. Cleared to return to physical therapy for shoulder.  3. Patient experiencing some post op pain. Likely neuropathic related. Will prescribe a trial of Gabapentin. Side effects reviewed; He is agreeable.   Recommendations reviewed with patient during this office visit, and all questions answered; Patient instructed on the importance of follow up and verbalizes understanding.  I, SARIAH Tse, personally performed the evaluation and management (E/M) services for this established patient. That E/M includes conducting the examination, assessing all new/exacerbated conditions, and establishing a new plan of care. Today, My ACP, Ofelia Dodge, was here to observe my evaluation and management services for this patient to be followed going forward.

## 2025-07-03 NOTE — CONSULT LETTER
[Consult Letter:] : I had the pleasure of evaluating your patient, [unfilled]. [( Thank you for referring [unfilled] for consultation for _____ )] : Thank you for referring [unfilled] for consultation for [unfilled] [Please see my note below.] : Please see my note below. [Consult Closing:] : Thank you very much for allowing me to participate in the care of this patient.  If you have any questions, please do not hesitate to contact me. [Sincerely,] : Sincerely, [FreeTextEntry2] : Dr. Rocio Bauer (Pulm/Referring) [FreeTextEntry3] : Sarmad Sol MD, MPH  System Director of Thoracic Surgery  Director of Comprehensive Lung and Foregut [place]  Professor Cardiovascular & Thoracic Surgery   [facility] [phone] [place] [address] [place] [address] Tel: [phone] Fax: [phone]

## 2025-07-03 NOTE — REASON FOR VISIT
[de-identified] : FB, Rt VATS, wedge rxn of RLL, MLND, additional wedge resection of right lower lobe staple margin [de-identified] : 6/2/25

## 2025-07-03 NOTE — ASSESSMENT
[FreeTextEntry1] : Mr. CHRISTOPHER AWAN, 56 year old male, current smoker (1/2 PPD x 40 years) , w/ hx of CAD, CHF, chronic pancreatitis, s/p Jose Guadalupe procedure 7/12/21. Developed obstructive jaundice 12/23 requiring stent placement. Stent subsequently removed in April 2024. Depression, Anxiety, COPD/Emphysema, iron-def anemia. Follows w/ Pulm, Dr. Rocio Bauer. Referred for further evaluation of right lower lung nodule which was demonstrated on LDCT.  CT Chest on 4/23/24: - stable 1 cm nodular opacity is noted in the posterior segment of the right lower lobe. - stable 0.7 cm groundglass nodule in the right lower lobe and a 0.3 cm solid nodule in the right upper lobe are noted. - No pleural effusions are noted.  Nodify test on 5/18/24: Moderate risk (15%).  PET/CT on 6/25/24: - Nonspecific uptake is noted in bilateral sternocleidomastoid muscles left more than right as well as the left longus coli muscle likely injury related. Nonspecific uptake is noted in both palatine and lingual tonsils - Slightly asymmetric uptake is noted in the right more than left submandibular gland. Attention on follow-up. - Small nonspecific subcentimeter bilateral level 2 faintly FDG avid lymph node nodes image 39, 42, 45 SUV 1.5 not significantly changed anatomically compared to PET CT scan from 1/16/2024. Attention on follow-up. - Previously noted right lower lobe lung nodule measures 1.1 x 0.9 cm image 138 (previously 0.6 x 0.5 cm) w/ SUV 1.2. - Stable right upper lobe 0.3 cm nodule image 89 too small to characterize by PET. - Biapical scarring with no abnormal FDG uptake. - Distinct FDG avid hepatic lesion in the caudate lobe image 160 SUV 4.8 with no clear CT correlate. Status post CBD stent removal. - Stable right adrenal gland 1.4 x 2.7 hypodensity with faint uptake less than liver suggestive of an adenoma image 165.  Nodule within posterior RLL w/ unclear etiology; Suspect may be inflammatory. Pt also would like to surveilliance.  CT Chest on 10/4/24 - no significant change in 1 cm nodular opacity in posterior segment of right lower lobe (3:433) which previously exhibited low intensity on prior PET scan.  CT C/A/P on 3/18/25: - increased 1.6 x 0.9 cm RLL solid nodule - stable scattered ggos: 0.5cm RUL (6:31); 0.5cm RUL (6:40); 0.6 cm RLL (6:61); 0.6 cm GOPAL (6:34); 0.4 cm GOPAL (6:60) - mildly increased biliary ductal dilation from 3/24/25 - T8 vertebral body compression with mult. chronic Lt rib fractures new since 10/4/24.  PFT on 3/4/25: FEV1 pre 3.41, 83%; DLCO 27.2, 123%.  PET/CT on 4/17/25 at [place] [facility]: - focal areas of activity in tongue; Rt anterolaterally (SUV 6.7) & Rt posterolaterally (SUV 5.7) - 0.3cm RUL nodular density (2:68) - 0.7cm GOPAL mixed ettenuation density (2:67) - 0.3cm GOPAL nodular density (2:78)  Now s/p FB, Rt VATS, wedge rxn of RLL, MLND, additional wedge resection of right lower lobe staple margin on 6/2/25. Path revealed RLL minimally invasive adenocarcinoma, non-mucinous, 9 mm with 2 mm invasive tumor size, all margins and LNs are negative, [id number]. Stg IA1.   CXR today - Stable, post op changes.   Patient presents today for post op evaluation. + pain to surgical site radiating anteriorly. Otherwise feels well; Has been increasing activity as tolerated. Today, patient denies hemoptysis, fever, chills, night sweats, lightheadedness or dizziness.  I have reviewed the patient's medical records and diagnostic images at time of this office consultation and have made the following recommendation: 1. Patient overall feeling well post operatively with stable CXR. Path reviewed; Stage I lung cancer. No need for adjuvant treatment at this time. Discussed role of active surveillance post op. Recommend repeating a non contrast CT Chest in 6 months for lung cancer surviellance as well as re-evaluate additional lung nodules. Patient is agreeable.  2. Cleared to return to physical therapy for shoulder.  3. Patient experiencing some post op pain. Likely neuropathic related. Will prescribe a trial of Gabapentin. Side effects reviewed; He is agreeable.   Recommendations reviewed with patient during this office visit, and all questions answered; Patient instructed on the importance of follow up and verbalizes understanding.  I, SARIAH Tse, personally performed the evaluation and management (E/M) services for this established patient. That E/M includes conducting the examination, assessing all new/exacerbated conditions, and establishing a new plan of care. Today, My ACP, Ofelia Dodge, was here to observe my evaluation and management services for this patient to be followed going forward.

## 2025-07-03 NOTE — CONSULT LETTER
[Consult Letter:] : I had the pleasure of evaluating your patient, [unfilled]. [( Thank you for referring [unfilled] for consultation for _____ )] : Thank you for referring [unfilled] for consultation for [unfilled] [Please see my note below.] : Please see my note below. [Consult Closing:] : Thank you very much for allowing me to participate in the care of this patient.  If you have any questions, please do not hesitate to contact me. [Sincerely,] : Sincerely, [FreeTextEntry2] : Dr. Rocio Bauer (Pulm/Referring) [FreeTextEntry3] : Sarmad Sol MD, MPH  System Director of Thoracic Surgery  Director of Comprehensive Lung and Foregut Squires  Professor Cardiovascular & Thoracic Surgery   [facility] [employer] [facility] [phone] [place] [address] [place] [address] Tel: [phone] Fax: [phone]

## 2025-07-23 NOTE — PROCEDURE
[Lesion] : lesion identified by mirror examination needing further evaluation [None] : none [Flexible Endoscope] : examined with the flexible endoscope [Normal] : normal [de-identified] : c FRANCY  no lesions seen

## 2025-07-23 NOTE — PROCEDURE
[Lesion] : lesion identified by mirror examination needing further evaluation [None] : none [Flexible Endoscope] : examined with the flexible endoscope [Normal] : normal [de-identified] : c FRANCY  no lesions seen

## 2025-07-23 NOTE — CONSULT LETTER
[Dear  ___] : Dear  [unfilled], [Consult Letter:] : I had the pleasure of evaluating your patient, [unfilled]. [Please see my note below.] : Please see my note below. [Consult Closing:] : Thank you very much for allowing me to participate in the care of this patient.  If you have any questions, please do not hesitate to contact me. [Sincerely,] : Sincerely, [FreeTextEntry2] : Lon Huerta DO ( [place], [place]) [FreeTextEntry3] : Rocco Campos MD, FACS     [facility] Associate Chair    Department of Otolaryngology  Professor Otolaryngology & Molecular Medicine [facility]

## 2025-07-23 NOTE — CONSULT LETTER
[Dear  ___] : Dear  [unfilled], [Consult Letter:] : I had the pleasure of evaluating your patient, [unfilled]. [Please see my note below.] : Please see my note below. [Consult Closing:] : Thank you very much for allowing me to participate in the care of this patient.  If you have any questions, please do not hesitate to contact me. [Sincerely,] : Sincerely, [FreeTextEntry2] : Lon Huerta DO ( [place], [place]) [FreeTextEntry3] : Rocco Campos MD, FACS     [facility] Associate Chair    Department of Otolaryngology  Professor Otolaryngology & Molecular Medicine [facility]

## 2025-07-23 NOTE — HISTORY OF PRESENT ILLNESS
[de-identified] : 57yo male who is being referred by Dr. Huerta for incidental finding noted on recent PET scan done for thrombocytopenia and  unintentional weigh loss. Gaining weight back.  no oral pain or voice change On 6/2/2025 surgery - right lung minimally invasive adenocarcinoma, no further treatment.   Denies any lumps noted on his neck. Denies pain, dysphagia,  odynophagia, dysphonia and or dyspnea. No otalgia.  4/17/2025 PET/CT scan reporting focal areas of activity in the tongue. Intense activity in the larynx at the levels of the vocal cords without abnormality on the low dose non contrast CT Denies cardiac.  Not on blood thinners. Emphysema on inhalers, never intubated for this.  Smokes 1/2 ppd now, but used to smoke 1 ppd for about 40 years. Ocassional alohol intake.

## 2025-07-23 NOTE — HISTORY OF PRESENT ILLNESS
[de-identified] : 57yo male who is being referred by Dr. Huerta for incidental finding noted on recent PET scan done for thrombocytopenia and  unintentional weigh loss. Gaining weight back.  no oral pain or voice change On 6/2/2025 surgery - right lung minimally invasive adenocarcinoma, no further treatment.   Denies any lumps noted on his neck. Denies pain, dysphagia,  odynophagia, dysphonia and or dyspnea. No otalgia.  4/17/2025 PET/CT scan reporting focal areas of activity in the tongue. Intense activity in the larynx at the levels of the vocal cords without abnormality on the low dose non contrast CT Denies cardiac.  Not on blood thinners. Emphysema on inhalers, never intubated for this.  Smokes 1/2 ppd now, but used to smoke 1 ppd for about 40 years. Ocassional alohol intake.